# Patient Record
Sex: MALE | Race: BLACK OR AFRICAN AMERICAN | NOT HISPANIC OR LATINO
[De-identification: names, ages, dates, MRNs, and addresses within clinical notes are randomized per-mention and may not be internally consistent; named-entity substitution may affect disease eponyms.]

---

## 2022-10-31 PROBLEM — Z00.00 ENCOUNTER FOR PREVENTIVE HEALTH EXAMINATION: Status: ACTIVE | Noted: 2022-10-31

## 2022-11-10 ENCOUNTER — APPOINTMENT (OUTPATIENT)
Dept: ORTHOPEDIC SURGERY | Facility: CLINIC | Age: 77
End: 2022-11-10

## 2022-11-10 DIAGNOSIS — S40.012A CONTUSION OF LEFT SHOULDER, INITIAL ENCOUNTER: ICD-10-CM

## 2022-11-10 DIAGNOSIS — Z78.9 OTHER SPECIFIED HEALTH STATUS: ICD-10-CM

## 2022-11-10 DIAGNOSIS — S43.422A SPRAIN OF LEFT ROTATOR CUFF CAPSULE, INITIAL ENCOUNTER: ICD-10-CM

## 2022-11-10 PROCEDURE — 73030 X-RAY EXAM OF SHOULDER: CPT | Mod: LT

## 2022-11-10 PROCEDURE — 99203 OFFICE O/P NEW LOW 30 MIN: CPT | Mod: 25

## 2022-11-10 NOTE — PHYSICAL EXAM
[Left] : left shoulder [Sitting] : sitting [Mild] : mild [4 ___] : forward flexion 4[unfilled]/5 [4___] : external rotation 4[unfilled]/5 [] : no erythema [TWNoteComboBox7] : active forward flexion 80 degrees

## 2022-11-10 NOTE — DISCUSSION/SUMMARY
[de-identified] : The documentation recorded by the scribe accurately reflects the service I personally performed and the decisions made by me.\par I, Nathan Hurley, attest that this documentation has been prepared under the direction and in the presence of Provider Beltran Baldwin MD.\par \par The patient was seen by Beltran Baldwin MD.\par The following radiographs were ordered and read by me during this patient's visit. I reviewed each radiograph in detail with the patient, and discussed the findings as highlighted below.\par

## 2022-11-10 NOTE — ASSESSMENT
[FreeTextEntry1] : 11/10/22: xray of the right shoulder reveal minimal gh narrowing. \par Due to trauma, with continued symptoms, night pain, and attempted pain relief with diclofenaca patches recommended\par mri of the left shoulder to r/o RCT. \par Questions addressed.\par Activity modifier as tolerated.\par consider Tylenol. \par

## 2022-11-10 NOTE — HISTORY OF PRESENT ILLNESS
[de-identified] : Pt is a 77 year old M who presents today for evaluation of their right shoulder. Pt states that, mentions basement, fall ~3 wks, went to ER, had xrays, and saw pcp, c/o of cracking. No pain in the R shoulder. The left shoulder is painful. There is night symptoms. Has records with him. He has been repeatedly using diclofenac topical patches and tiger balm.  [] : Post Surgical Visit: no [FreeTextEntry1] : L shoulder

## 2022-11-10 NOTE — CONSULT LETTER
[Dear  ___] : Dear  [unfilled], [Consult Letter:] : I had the pleasure of evaluating your patient, [unfilled]. [Please see my note below.] : Please see my note below. [Consult Closing:] : Thank you very much for allowing me to participate in the care of this patient.  If you have any questions, please do not hesitate to contact me. [Sincerely,] : Sincerely, [FreeTextEntry3] : Beltran Baldwin MD\par

## 2022-11-18 ENCOUNTER — APPOINTMENT (OUTPATIENT)
Dept: MRI IMAGING | Facility: CLINIC | Age: 77
End: 2022-11-18

## 2022-11-21 ENCOUNTER — FORM ENCOUNTER (OUTPATIENT)
Age: 77
End: 2022-11-21

## 2022-11-22 ENCOUNTER — APPOINTMENT (OUTPATIENT)
Dept: MRI IMAGING | Facility: CLINIC | Age: 77
End: 2022-11-22
Payer: MEDICARE

## 2022-11-22 PROCEDURE — 73221 MRI JOINT UPR EXTREM W/O DYE: CPT | Mod: LT

## 2022-12-08 ENCOUNTER — APPOINTMENT (OUTPATIENT)
Dept: ORTHOPEDIC SURGERY | Facility: CLINIC | Age: 77
End: 2022-12-08

## 2022-12-08 VITALS — BODY MASS INDEX: 31.07 KG/M2 | HEIGHT: 68 IN | WEIGHT: 205 LBS

## 2022-12-08 DIAGNOSIS — S46.912D STRAIN OF UNSPECIFIED MUSCLE, FASCIA AND TENDON AT SHOULDER AND UPPER ARM LEVEL, LEFT ARM, SUBSEQUENT ENCOUNTER: ICD-10-CM

## 2022-12-08 PROCEDURE — 99214 OFFICE O/P EST MOD 30 MIN: CPT

## 2022-12-08 RX ORDER — MELOXICAM 15 MG/1
15 TABLET ORAL
Qty: 30 | Refills: 0 | Status: COMPLETED | COMMUNITY
Start: 2022-12-08 | End: 2023-01-07

## 2022-12-08 NOTE — DATA REVIEWED
[MRI] : MRI [Left] : left [Shoulder] : shoulder [Report was reviewed and noted in the chart] : The report was reviewed and noted in the chart [I reviewed the films/CD and agree] : I reviewed the films/CD and agree [FreeTextEntry1] : Impression: left shoulder\par 1. Partial articular sided and interstitial tearing of the supraspinatus tendon at the insertion with tendinopathy and bursal surface fraying of the remainder of the tendon.\par 2. Partial interstitial tear of the infraspinatus tendon at the insertion with tendinopathy and bursal surface \par fraying.\par 3. Biceps tenosynovitis. Intact biceps anchor.\par 4. Tendinopathy of the subscapularis tendon.\par 5. Probable sub-labral foramen of the anterior/superior labrum.\par 6. Small joint effusion.\par 7. 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 Lateral downsloping acromion which may cause \par impingement.\par E-signed by Ivan Momin M.D. on 11/23/2022 08:53:11

## 2023-03-02 ENCOUNTER — APPOINTMENT (OUTPATIENT)
Dept: ORTHOPEDIC SURGERY | Facility: CLINIC | Age: 78
End: 2023-03-02
Payer: MEDICARE

## 2023-03-02 VITALS — HEIGHT: 67 IN | BODY MASS INDEX: 32.18 KG/M2 | WEIGHT: 205 LBS

## 2023-03-02 DIAGNOSIS — R29.2 ABNORMAL REFLEX: ICD-10-CM

## 2023-03-02 DIAGNOSIS — M75.42 IMPINGEMENT SYNDROME OF LEFT SHOULDER: ICD-10-CM

## 2023-03-02 DIAGNOSIS — M19.012 PRIMARY OSTEOARTHRITIS, LEFT SHOULDER: ICD-10-CM

## 2023-03-02 PROCEDURE — 99213 OFFICE O/P EST LOW 20 MIN: CPT

## 2023-03-02 RX ORDER — MELOXICAM 15 MG/1
15 TABLET ORAL DAILY
Qty: 30 | Refills: 1 | Status: ACTIVE | COMMUNITY
Start: 2023-03-02 | End: 1900-01-01

## 2023-03-02 NOTE — HISTORY OF PRESENT ILLNESS
[6] : 6 [5] : 5 [Dull/Aching] : dull/aching [Localized] : localized [Tightness] : tightness [Constant] : constant [de-identified] : 3/2/23: F/u R shoulder. Symptoms have been improving. Notes relief with Mobic.\par \par Pt is a 77 year old M who presents today for evaluation of their right shoulder. Pt states that, mentions basement, fall ~3 wks, went to ER, had xrays, and saw pcp, c/o of cracking. No pain in the R shoulder. The left shoulder is painful. There is night symptoms. Has records with him. He has been repeatedly using diclofenac topical patches and tiger balm.  [] : Post Surgical Visit: no [FreeTextEntry1] : L shoulder [FreeTextEntry6] : limited range of motion [de-identified] : MRI [de-identified] : Meds

## 2023-03-02 NOTE — PHYSICAL EXAM
[Left] : left shoulder [Sitting] : sitting [Mild] : mild [4 ___] : forward flexion 4[unfilled]/5 [4___] : external rotation 4[unfilled]/5 [] : no erythema [FreeTextEntry8] : mild [TWNoteComboBox7] : active forward flexion 0 degrees

## 2023-03-02 NOTE — ASSESSMENT
[FreeTextEntry1] : 11/10/22: xray of the right shoulder negative\par Due to trauma, with continued symptoms, night pain, and attempted pain relief with diclofenac patches recommended\par mri of the left shoulder to r/o RCT. \par consider Tylenol. \par \par 12/08/2022: MRI reviewed and discussed.\par No surgical intervention indicated. \par Activity modifier as tolerated.\par prescribed mobic and PT to improve ROM and flexability. \par If no improvement consider steroid injection. Questions addressed.\par \par \par 3/2/23: Symptoms improving. \par Rx for Mobic refilled.\par Return PRN.\par \par Progress note completed by JOSE Gaines acting as scribe for Beltran Baldwin M.D.\par